# Patient Record
Sex: MALE | Race: BLACK OR AFRICAN AMERICAN | NOT HISPANIC OR LATINO | Employment: FULL TIME | URBAN - METROPOLITAN AREA
[De-identification: names, ages, dates, MRNs, and addresses within clinical notes are randomized per-mention and may not be internally consistent; named-entity substitution may affect disease eponyms.]

---

## 2020-10-17 ENCOUNTER — CLINICAL SUPPORT (OUTPATIENT)
Dept: URGENT CARE | Facility: CLINIC | Age: 45
End: 2020-10-17
Payer: COMMERCIAL

## 2020-10-17 VITALS — OXYGEN SATURATION: 97 % | HEART RATE: 101 BPM | TEMPERATURE: 98 F

## 2020-10-17 DIAGNOSIS — Z11.9 SCREENING EXAMINATION FOR UNSPECIFIED INFECTIOUS DISEASE: Primary | ICD-10-CM

## 2020-10-17 LAB
CTP QC/QA: YES
SARS-COV-2 RDRP RESP QL NAA+PROBE: NEGATIVE

## 2020-10-17 PROCEDURE — U0002: ICD-10-PCS | Mod: QW,S$GLB,, | Performed by: FAMILY MEDICINE

## 2020-10-17 PROCEDURE — U0002 COVID-19 LAB TEST NON-CDC: HCPCS | Mod: QW,S$GLB,, | Performed by: FAMILY MEDICINE

## 2020-10-17 NOTE — PROGRESS NOTES
Subjective:       Patient ID: Dalton Jackson is a 44 y.o. male.    Chief Complaint: Essential Rapid Covid    Essential rapid covid test completed by [facility].    ROS     Objective:      Physical Exam    Assessment:       No diagnosis found.    Plan:                   No follow-ups on file.